# Patient Record
Sex: FEMALE | Race: BLACK OR AFRICAN AMERICAN | Employment: UNEMPLOYED | URBAN - METROPOLITAN AREA
[De-identification: names, ages, dates, MRNs, and addresses within clinical notes are randomized per-mention and may not be internally consistent; named-entity substitution may affect disease eponyms.]

---
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
## 2021-08-05 ENCOUNTER — OFFICE VISIT (OUTPATIENT)
Dept: PEDIATRICS CLINIC | Age: 21
End: 2021-08-05
Payer: COMMERCIAL

## 2021-08-05 VITALS
SYSTOLIC BLOOD PRESSURE: 100 MMHG | HEART RATE: 92 BPM | TEMPERATURE: 98.1 F | RESPIRATION RATE: 20 BRPM | DIASTOLIC BLOOD PRESSURE: 62 MMHG | OXYGEN SATURATION: 100 % | BODY MASS INDEX: 22.15 KG/M2 | HEIGHT: 63 IN | WEIGHT: 125 LBS

## 2021-08-05 DIAGNOSIS — Z23 ENCOUNTER FOR IMMUNIZATION: ICD-10-CM

## 2021-08-05 DIAGNOSIS — Z53.20 SCREENING FOR HEPATITIS C DECLINED: ICD-10-CM

## 2021-08-05 DIAGNOSIS — Z13.0 SCREENING, IRON DEFICIENCY ANEMIA: ICD-10-CM

## 2021-08-05 DIAGNOSIS — Z53.20 HIV SCREENING DECLINED: ICD-10-CM

## 2021-08-05 DIAGNOSIS — Z00.00 ROUTINE GENERAL MEDICAL EXAMINATION AT A HEALTH CARE FACILITY: Primary | ICD-10-CM

## 2021-08-05 DIAGNOSIS — Z13.21 ENCOUNTER FOR VITAMIN DEFICIENCY SCREENING: ICD-10-CM

## 2021-08-05 DIAGNOSIS — Z13.220 SCREENING FOR LIPID DISORDERS: ICD-10-CM

## 2021-08-05 PROCEDURE — 90715 TDAP VACCINE 7 YRS/> IM: CPT | Performed by: PEDIATRICS

## 2021-08-05 PROCEDURE — 99395 PREV VISIT EST AGE 18-39: CPT | Performed by: PEDIATRICS

## 2021-08-05 RX ORDER — SERTRALINE HYDROCHLORIDE 50 MG/1
TABLET, FILM COATED ORAL
COMMUNITY
Start: 2021-06-05

## 2021-08-05 NOTE — PROGRESS NOTES
Kan Solorio is a 24 y.o. female who presents for routine immunizations. She denies any symptoms , reactions or allergies that would exclude them from being immunized today. Risks and adverse reactions were discussed and the VIS was given to them. All questions were addressed. She was observed for 5 min post injection. There were no reactions observed.     Kizzy Poole

## 2021-08-05 NOTE — PROGRESS NOTES
History  Betty Thomas is a 24 y.o. female presenting for well adolescent and/or school/sports physical. She is seen today alone. Parental concerns: she has been doing well  Follow up on previous concerns:    Counseling once a week  On sertraline 50 mg daily-psychiatrist through [facility]      Patient's last menstrual period was 07/15/2021. Regularity:  yes  Menstrual problems:  none  GYN annually      Social/Family History  Changes since last visit:  Lives in [place]   Visits her mother every other week  Relationship with parents/siblings:  normal    Risk Assessment    Education:   Grade:  Attends [employer], [employer]   Performance: doing well  Eating:   Eats regular meals including adequate fruits and vegetables:  Yes-2 meals a day    Drinks non-sweetened liquids:  Yes- water   Calcium source:  no   Has concerns about body or appearance:  no  Activities:   Has friends:  yes   At least 1 hour of physical activity/day:  2 days a week   Screen time (except for homework) less than 2 hrs/day:  no   Has interests/participates in community activities/volunteers:  No   Works at [address] [place] [address] [employer] (Substance use/abuse):    Uses tobacco/alcohol/drugs:  Yes-recreational/occasional, no tobacco  Safety:   Home is free of violence:  yes   Uses safety belts/safety equipment:  yes   Has relationships free of violence:  no   Impaired/Distracted driving:  no  Sex:   Sexually active?:  yes  Suicidality/Mental Health:   Has ways to cope with stress:  yes   Displays self-confidence:  yes   Has problems with sleep:  6-9 hours   Gets depressed, anxious, or irritable/has mood swings:    followed by psychiatrist, counselor once a week   Has thought about hurting self or considered suicide:  no    3 most recent PHQ Screens 8/5/2021   Little interest or pleasure in doing things Not at all   Feeling down, depressed, irritable, or hopeless Not at all   Total Score PHQ 2 0   Trouble falling or staying asleep, or sleeping too much -   Feeling tired or having little energy -   Poor appetite, weight loss, or overeating -   Feeling bad about yourself - or that you are a failure or have let yourself or your family down -   Trouble concentrating on things such as school, work, reading, or watching TV -   Moving or speaking so slowly that other people could have noticed; or the opposite being so fidgety that others notice -   Thoughts of being better off dead, or hurting yourself in some way -   PHQ 9 Score -   How difficult have these problems made it for you to do your work, take care of your home and get along with others -       Review of Systems  Constitutional: negative  Eyes: negative  Ears, nose, mouth, throat, and face: negative  Respiratory: negative  Cardiovascular: negative  Gastrointestinal: negative  Musculoskeletal:negative  Neurological: negative  Behavioral/Psych: see above  Allergic/Immunologic: negative    Patient Active Problem List    Diagnosis Date Noted    Depressed mood 08/04/2020    BMI (body mass index), pediatric, 5% to less than 85% for age 08/04/2020    Pilonidal cyst 12/14/2018    Vitamin D deficiency 08/09/2017    BMI (body mass index), pediatric, 85% to less than 95% for age 08/09/2017    Menorrhagia with regular cycle 05/25/2016    Allergic rhinitis 05/23/2013     Current Outpatient Medications   Medication Sig Dispense Refill    norethindrone-e.estradiol-iron (LO LOESTRIN FE) 1 mg-10 mcg (24)/10 mcg (2) tab Take  by mouth.       sertraline (ZOLOFT) 50 mg tablet TAKE 1 TABLET BY MOUTH EVERYDAY AT BEDTIME       No Known Allergies  Past Medical History:   Diagnosis Date    Allergic rhinitis 5/23/2013    Constipation     Ill-defined condition     ANXIETY DEPRESSION     Past Surgical History:   Procedure Laterality Date    HX HEENT      ear tubes at 4 and 6 yrs of age   [facility] HX OTHER SURGICAL  12/14/2018    excision pilonidal cyst     Family History   Problem Relation Age of Onset    High Cholesterol Mother     Anxiety Mother  Cancer Maternal Grandmother         Breast     Allergic Rhinitis Maternal Grandmother     Alcohol abuse Maternal Grandfather     Cancer Maternal Grandfather     High Cholesterol Maternal Grandfather     Cancer Paternal Grandfather         breast     Allergic Rhinitis Child     Cancer Paternal Grandmother     Cataract Paternal Grandmother     Diabetes Paternal Grandmother      Social History     Tobacco Use    Smoking status: Never Smoker    Smokeless tobacco: Never Used   Substance Use Topics    Alcohol use: No        Lab Results   Component Value Date/Time    WBC 4.0 07/29/2020 11:58 AM    HGB 13.8 07/29/2020 11:58 AM    Hemoglobin (POC) 13.0 06/23/2014 02:27 PM    HCT 40.4 07/29/2020 11:58 AM    PLATELET 196 25/44/2323 11:58 AM    MCV 91 07/29/2020 11:58 AM     Lab Results   Component Value Date/Time    Cholesterol, total 146 06/26/2015 08:15 AM    HDL Cholesterol 59 06/26/2015 08:15 AM    LDL, calculated 76 06/26/2015 08:15 AM    Triglyceride 55 06/26/2015 08:15 AM     Lab Results   Component Value Date/Time    TSH 1.660 07/29/2020 11:58 AM    T4, Free 1.37 07/29/2020 11:58 AM        Objective:    Visit Vitals  /62 (BP 1 Location: Left arm, BP Patient Position: Sitting)   Pulse 92   Temp 98.1 °F (36.7 °C) (Oral)   Resp 20   Ht 5' 2.5\" (1.588 m)   Wt 125 lb (56.7 kg)   LMP 07/15/2021   SpO2 100%   BMI 22.50 kg/m²         General appearance  alert, cooperative, no distress, appears stated age   Head  Normocephalic, without obvious abnormality, atraumatic   Eyes  conjunctivae/corneas clear. PERRL, EOM's intact. Fundi benign   Ears  normal TM's and external ear canals AU   Nose Nares normal. Septum midline. Mucosa normal. No drainage or sinus tenderness.    Throat Lips, mucosa, and tongue normal. Teeth and gums normal   Neck supple, symmetrical, trachea midline, no adenopathy, thyroid: not enlarged, symmetric, no tenderness/mass/nodules, no carotid bruit and no JVD   Back   symmetric, no curvature. ROM normal. No CVA tenderness   Lungs   clear to auscultation bilaterally   Breasts  no masses, tenderness   Heart  regular rate and rhythm, S1, S2 normal, no murmur, click, rub or gallop   Abdomen   soft, non-tender. Bowel sounds normal. No masses,  No organomegaly   Pelvic Deferred; :Golden 5-chaperone present-LPN   Extremities extremities normal, atraumatic, no cyanosis or edema   Pulses 2+ and symmetric   Skin Skin color, texture, turgor normal. No rashes or lesions   Lymph nodes Cervical, supraclavicular, and axillary nodes normal.   Neurologic Normal exam, normal DTR's         Assessment:    Healthy 24 y.o. old female with no physical activity limitations. Plan:  Anticipatory Guidance: Gave a handout on well teen issues at this age , importance of varied diet, minimize junk food, importance of regular dental care, seat belts/ sports protective gear/ helmet safety/ swimming safety, healthy sexual awareness/ relationships, reviewed tobacco, alcohol and drug dangers      The patient was counseled regarding nutrition and physical activity. BMI is within normal range, encouraged healthy eating habits and exercise. Discussed immunizations, side effects, risks and benefits  Information sheets given and consent signed          ICD-10-CM ICD-9-CM    1. Routine general medical examination at a health care facility  Z00.00 V70.0    2. Screening, iron deficiency anemia  Z13.0 V78.0 HEMOGLOBIN   3. Screening for lipid disorders  Z13.220 V77.91 LIPID PANEL   4. Encounter for vitamin deficiency screening  Z13.21 V77.99 VITAMIN D, 25 HYDROXY   5. Screening for hepatitis C declined  Z53.20 V64.2    6. HIV screening declined  Z53.20 V64.2    7. Encounter for immunization  Z23 V03.89 TETANUS, DIPHTHERIA TOXOIDS AND ACELLULAR PERTUSSIS VACCINE (TDAP), IN INDIVIDS. >=7, IM         Follow-up and Dispositions    · Return in about 1 year (around 8/5/2022) for well child check.

## 2021-08-05 NOTE — PATIENT INSTRUCTIONS
Well Visit, Ages 25 to 48: Care Instructions  Overview     Well visits can help you stay healthy. Your doctor has checked your overall health and may have suggested ways to take good care of yourself. Your doctor also may have recommended tests. At home, you can help prevent illness with healthy eating, regular exercise, and other steps. Follow-up care is a key part of your treatment and safety. Be sure to make and go to all appointments, and call your doctor if you are having problems. It's also a good idea to know your test results and keep a list of the medicines you take. How can you care for yourself at home? · Get screening tests that you and your doctor decide on. Screening helps find diseases before any symptoms appear. · Eat healthy foods. Choose fruits, vegetables, whole grains, protein, and low-fat dairy foods. Limit fat, especially saturated fat. Reduce salt in your diet. · Limit alcohol. If you are a man, have no more than 2 drinks a day or 14 drinks a week. If you are a woman, have no more than 1 drink a day or 7 drinks a week. · Get at least 30 minutes of physical activity on most days of the week. Walking is a good choice. You also may want to do other activities, such as running, swimming, cycling, or playing tennis or team sports. Discuss any changes in your exercise program with your doctor. · Reach and stay at a healthy weight. This will lower your risk for many problems, such as obesity, diabetes, heart disease, and high blood pressure. · Do not smoke or allow others to smoke around you. If you need help quitting, talk to your doctor about stop-smoking programs and medicines. These can increase your chances of quitting for good. · Care for your mental health. It is easy to get weighed down by worry and stress. Learn strategies to manage stress, like deep breathing and mindfulness, and stay connected with your family and community.  If you find you often feel sad or hopeless, talk with your doctor. Treatment can help. · Talk to your doctor about whether you have any risk factors for sexually transmitted infections (STIs). You can help prevent STIs if you wait to have sex with a new partner (or partners) until you've each been tested for STIs. It also helps if you use condoms (male or female condoms) and if you limit your sex partners to one person who only has sex with you. Vaccines are available for some STIs, such as HPV. · Use birth control if it's important to you to prevent pregnancy. Talk with your doctor about the choices available and what might be best for you. · If you think you may have a problem with alcohol or drug use, talk to your doctor. This includes prescription medicines (such as amphetamines and opioids) and illegal drugs (such as cocaine and methamphetamine). Your doctor can help you figure out what type of treatment is best for you. · Protect your skin from too much sun. When you're outdoors from 10 a.m. to 4 p.m., stay in the shade or cover up with clothing and a hat with a wide brim. Wear sunglasses that block UV rays. Even when it's cloudy, put broad-spectrum sunscreen (SPF 30 or higher) on any exposed skin. · See a dentist one or two times a year for checkups and to have your teeth cleaned. · Wear a seat belt in the car. When should you call for help? Watch closely for changes in your health, and be sure to contact your doctor if you have any problems or symptoms that concern you. Where can you learn more? Go to http://www."[employer].com/  Enter P072 in the search box to learn more about \"Well Visit, Ages 25 to 48: Care Instructions. \"  Current as of: May 27, 2020               Content Version: 12.8  © 1987-8662 Healthwise, Incorporated. Care instructions adapted under license by [employer] (which disclaims liability or warranty for this information).  If you have questions about a medical condition or this instruction, always ask your healthcare professional. [place] [address] any warranty or liability for your use of this information.

## 2021-08-06 LAB
25(OH)D3+25(OH)D2 SERPL-MCNC: 13.8 NG/ML (ref 30–100)
CHOLEST SERPL-MCNC: 188 MG/DL (ref 100–169)
HDLC SERPL-MCNC: 62 MG/DL
HGB BLD-MCNC: 13.6 G/DL (ref 11.1–15.9)
LDLC SERPL CALC-MCNC: 114 MG/DL (ref 0–109)
TRIGL SERPL-MCNC: 63 MG/DL (ref 0–89)
VLDLC SERPL CALC-MCNC: 12 MG/DL (ref 5–40)

## 2021-08-12 ENCOUNTER — TELEPHONE (OUTPATIENT)
Dept: PEDIATRICS CLINIC | Age: 21
End: 2021-08-12

## 2021-08-12 NOTE — TELEPHONE ENCOUNTER
Contacted pt, verified pt info, advised patient of lab results and provider's recommendations: \"Please advise patient that her lipid panel shows mildly elevated LDL, her HDL is elevated which is protective, encourage healthy eating habits and exercise  Hemoglobin WNL  Vitamin D low, advise to start vitamin D3 [address] daily, recheck in 3 months\"    Pt verbalized understanding of results and recommendations, was appreciative of call, and stated she would call office back to schedule lab f/u in 3 months

## 2021-08-12 NOTE — TELEPHONE ENCOUNTER
----- Message from Caitie Francisco sent at 8/11/2021  5:19 PM EDT -----  Regarding: Dr Benjy Alfaro  Patient return call    Caller's first and last name and relationship (if not the patient):      Best contact number(s):[phone]      Whose call is being returned:nurse      Details to clarify the request:regarding her labs      Caitie Francisco

## 2021-10-27 ENCOUNTER — TELEPHONE (OUTPATIENT)
Dept: PEDIATRICS CLINIC | Age: 21
End: 2021-10-27

## 2022-03-18 PROBLEM — E55.9 VITAMIN D DEFICIENCY: Status: ACTIVE | Noted: 2017-08-09

## 2022-03-19 PROBLEM — R45.89 DEPRESSED MOOD: Status: ACTIVE | Noted: 2020-08-04

## 2022-03-20 PROBLEM — L05.91 PILONIDAL CYST: Status: ACTIVE | Noted: 2018-12-14

## 2024-02-20 NOTE — TELEPHONE ENCOUNTER
Mother calling to get pt in for an appt before school starts. She said she if available July 10th- aug 10th after any day after 12pm, and any Friday she is available all day. After Aug 10th until school starts she's available all day.  [phone] Complete

## (undated) DEVICE — HANDLE LT SNAP ON ULT DURABLE LENS FOR TRUMPF ALC DISPOSABLE

## (undated) DEVICE — GOWN,SIRUS,NONRNF,SETINSLV,2XL,18/CS: Brand: MEDLINE

## (undated) DEVICE — BULB SYRINGE, IRRIGATION WITH PROTECTIVE CAP, 60 CC, INDIVIDUALLY WRAPPED: Brand: DOVER

## (undated) DEVICE — DRAPE,UTILTY,TAPE,15X26, 4EA/PK: Brand: MEDLINE

## (undated) DEVICE — INFECTION CONTROL KIT SYS

## (undated) DEVICE — TRAY PREP DRY W/ PREM GLV 2 APPL 6 SPNG 2 UNDPD 1 OVERWRAP

## (undated) DEVICE — DEVON™ KNEE AND BODY STRAP 60" X 3" (1.5 M X 7.6 CM): Brand: DEVON

## (undated) DEVICE — SOLUTION IV 1000ML 0.9% SOD CHL

## (undated) DEVICE — DRAPE,LAPAROTOMY,T,PEDI,STERILE: Brand: MEDLINE

## (undated) DEVICE — MEDI-VAC NON-CONDUCTIVE SUCTION TUBING: Brand: CARDINAL HEALTH

## (undated) DEVICE — Device

## (undated) DEVICE — ELECTRODE NDL 2.8IN COAT VALLEYLAB

## (undated) DEVICE — 1200 GUARD II KIT W/5MM TUBE W/O VAC TUBE: Brand: GUARDIAN

## (undated) DEVICE — NEEDLE HYPO 25GA L1.5IN BVL ORIENTED ECLIPSE

## (undated) DEVICE — PUNCH SURG DIA3MM DISP FOR SKIN BX ACUPNCH 25 PER BX

## (undated) DEVICE — REM POLYHESIVE ADULT PATIENT RETURN ELECTRODE: Brand: VALLEYLAB

## (undated) DEVICE — SYR 3ML LL TIP 1/10ML GRAD --